# Patient Record
Sex: FEMALE | Race: BLACK OR AFRICAN AMERICAN
[De-identification: names, ages, dates, MRNs, and addresses within clinical notes are randomized per-mention and may not be internally consistent; named-entity substitution may affect disease eponyms.]

---

## 2018-02-24 ENCOUNTER — HOSPITAL ENCOUNTER (EMERGENCY)
Dept: HOSPITAL 17 - NEPD | Age: 23
Discharge: HOME | End: 2018-02-24
Payer: SELF-PAY

## 2018-02-24 VITALS
OXYGEN SATURATION: 99 % | HEART RATE: 98 BPM | TEMPERATURE: 98.4 F | DIASTOLIC BLOOD PRESSURE: 80 MMHG | SYSTOLIC BLOOD PRESSURE: 132 MMHG | RESPIRATION RATE: 14 BRPM

## 2018-02-24 VITALS — WEIGHT: 143.3 LBS | BODY MASS INDEX: 24.46 KG/M2 | HEIGHT: 64 IN

## 2018-02-24 DIAGNOSIS — R11.2: Primary | ICD-10-CM

## 2018-02-24 LAB
ALBUMIN SERPL-MCNC: 4.3 GM/DL (ref 3.4–5)
ALP SERPL-CCNC: 67 U/L (ref 45–117)
ALT SERPL-CCNC: 9 U/L (ref 10–53)
AST SERPL-CCNC: 15 U/L (ref 15–37)
BASOPHILS # BLD AUTO: 0 TH/MM3 (ref 0–0.2)
BASOPHILS NFR BLD: 0.3 % (ref 0–2)
BILIRUB SERPL-MCNC: 1 MG/DL (ref 0.2–1)
BUN SERPL-MCNC: 6 MG/DL (ref 7–18)
CALCIUM SERPL-MCNC: 9.5 MG/DL (ref 8.5–10.1)
CHLORIDE SERPL-SCNC: 105 MEQ/L (ref 98–107)
CREAT SERPL-MCNC: 0.78 MG/DL (ref 0.5–1)
EOSINOPHIL # BLD: 0 TH/MM3 (ref 0–0.4)
EOSINOPHIL NFR BLD: 0.2 % (ref 0–4)
ERYTHROCYTE [DISTWIDTH] IN BLOOD BY AUTOMATED COUNT: 14.4 % (ref 11.6–17.2)
GFR SERPLBLD BASED ON 1.73 SQ M-ARVRAT: 93 ML/MIN (ref 89–?)
GLUCOSE SERPL-MCNC: 87 MG/DL (ref 74–106)
HCO3 BLD-SCNC: 23.6 MEQ/L (ref 21–32)
HCT VFR BLD CALC: 36.3 % (ref 35–46)
HGB BLD-MCNC: 12.5 GM/DL (ref 11.6–15.3)
LYMPHOCYTES # BLD AUTO: 1.4 TH/MM3 (ref 1–4.8)
LYMPHOCYTES NFR BLD AUTO: 20.6 % (ref 9–44)
MCH RBC QN AUTO: 30 PG (ref 27–34)
MCHC RBC AUTO-ENTMCNC: 34.4 % (ref 32–36)
MCV RBC AUTO: 87.2 FL (ref 80–100)
MONOCYTE #: 0.4 TH/MM3 (ref 0–0.9)
MONOCYTES NFR BLD: 5.8 % (ref 0–8)
NEUTROPHILS # BLD AUTO: 5 TH/MM3 (ref 1.8–7.7)
NEUTROPHILS NFR BLD AUTO: 73.1 % (ref 16–70)
PLATELET # BLD: 248 TH/MM3 (ref 150–450)
PMV BLD AUTO: 8.7 FL (ref 7–11)
PROT SERPL-MCNC: 8.8 GM/DL (ref 6.4–8.2)
RBC # BLD AUTO: 4.16 MIL/MM3 (ref 4–5.3)
SODIUM SERPL-SCNC: 135 MEQ/L (ref 136–145)
WBC # BLD AUTO: 6.8 TH/MM3 (ref 4–11)

## 2018-02-24 PROCEDURE — 80053 COMPREHEN METABOLIC PANEL: CPT

## 2018-02-24 PROCEDURE — 85025 COMPLETE CBC W/AUTO DIFF WBC: CPT

## 2018-02-24 PROCEDURE — 99284 EMERGENCY DEPT VISIT MOD MDM: CPT

## 2018-02-24 PROCEDURE — 84702 CHORIONIC GONADOTROPIN TEST: CPT

## 2018-02-24 PROCEDURE — 96360 HYDRATION IV INFUSION INIT: CPT

## 2018-02-24 NOTE — PD
HPI


Chief Complaint:  GI Complaint


Time Seen by Provider:  20:00


Travel History


International Travel<30 days:  No


Contact w/Intl Traveler<30days:  No


Traveled to known affect area:  No





History of Present Illness


HPI


The patient was seen and examined in the presence of the nurse.  This patient 

complains of nausea and vomiting.  Duration is 3 days.  Severity is moderate.  

She is not having constipation or diarrhea or abdominal pain.  No ill contacts.

  She complains of nausea right now.  She denies pregnancy.  No fevers.  No 

alleviating factors.  No exacerbating factors.





PFSH


Past Medical History


Medical History:  Denies Significant Hx


Influenza Vaccination:  No


Pregnant?:  Not Pregnant


LMP:  2 WEEKS AGO


:  0





Past Surgical History


Surgical History:  No Previous Surgery





Social History


Alcohol Use:  No


Tobacco Use:  No


Substance Use:  No





Allergies-Medications


(Allergen,Severity, Reaction):  


Coded Allergies:  


     No Known Allergies (Unverified , 18)


Reported Meds & Prescriptions





Reported Meds & Active Scripts


Active


No Active Prescriptions or Reported Medications    








Review of Systems


General / Constitutional:  No: Fever


Eyes:  No: Visual changes


HENT:  No: Headaches


Cardiovascular:  No: Chest Pain or Discomfort


Respiratory:  No: Shortness of Breath


Gastrointestinal:  Positive: Nausea, Vomiting, No: Abdominal Pain


Genitourinary:  No: Dysuria


Musculoskeletal:  No: Pain


Skin:  No Rash


Neurologic:  No: Weakness


Psychiatric:  No: Depression


Endocrine:  No: Polydipsia


Hematologic/Lymphatic:  No: Easy Bruising





Physical Exam


Narrative


GENERAL: Well-nourished, well-developed patient in no apparent distress.


SKIN: Focused skin assessment reveals no rash and nodules. Skin is Warm and dry.


HEAD: Atraumatic. Normocephalic. 


EYES: Pupils equal and round. No scleral icterus. No injection or drainage. 


ENT: No nasal bleeding or discharge.  Mucous membranes pink and moist.


NECK: Trachea midline. No JVD. 


CARDIOVASCULAR: Regular rate and rhythm.  No murmur appreciated.


RESPIRATORY: No accessory muscle use. Clear to auscultation. Breath sounds 

equal bilaterally. 


GASTROINTESTINAL: Abdomen soft, non-tender, nondistended. Hepatic and splenic 

margins not palpable. 


MUSCULOSKELETAL: No obvious deformities. No clubbing.  No cyanosis.  No edema. 


NEUROLOGICAL: Awake and alert. No obvious cranial nerve deficits.  Motor 

grossly within normal limits. Normal speech.


PSYCHIATRIC: Appropriate mood and affect; insight and judgment normal.





Data


Data


Last Documented VS





Vital Signs








  Date Time  Temp Pulse Resp B/P (MAP) Pulse Ox O2 Delivery O2 Flow Rate FiO2


 


18 18:46 98.4 98 14 132/80 (97) 99 Room Air  








Orders





 Orders


Iv Access Insert/Monitor (18 20:06)


Complete Blood Count With Diff (18 20:06)


Beta Hcg (Quant/Titer) (18 20:06)


Comprehensive Metabolic Panel (18 20:06)


Ondansetron Inj (Zofran Inj) (18 20:15)


Sodium Chlor 0.9% 1000 Ml Inj (Ns 1000 M (18 20:15)





Labs





Laboratory Tests








Test


  18


20:09


 


White Blood Count 6.8 TH/MM3 


 


Red Blood Count 4.16 MIL/MM3 


 


Hemoglobin 12.5 GM/DL 


 


Hematocrit 36.3 % 


 


Mean Corpuscular Volume 87.2 FL 


 


Mean Corpuscular Hemoglobin 30.0 PG 


 


Mean Corpuscular Hemoglobin


Concent 34.4 % 


 


 


Red Cell Distribution Width 14.4 % 


 


Platelet Count 248 TH/MM3 


 


Mean Platelet Volume 8.7 FL 


 


Neutrophils (%) (Auto) 73.1 % 


 


Lymphocytes (%) (Auto) 20.6 % 


 


Monocytes (%) (Auto) 5.8 % 


 


Eosinophils (%) (Auto) 0.2 % 


 


Basophils (%) (Auto) 0.3 % 


 


Neutrophils # (Auto) 5.0 TH/MM3 


 


Lymphocytes # (Auto) 1.4 TH/MM3 


 


Monocytes # (Auto) 0.4 TH/MM3 


 


Eosinophils # (Auto) 0.0 TH/MM3 


 


Basophils # (Auto) 0.0 TH/MM3 


 


CBC Comment DIFF FINAL 


 


Differential Comment  


 


Blood Urea Nitrogen 6 MG/DL 


 


Creatinine 0.78 MG/DL 


 


Random Glucose 87 MG/DL 


 


Total Protein 8.8 GM/DL 


 


Albumin 4.3 GM/DL 


 


Calcium Level 9.5 MG/DL 


 


Alkaline Phosphatase 67 U/L 


 


Aspartate Amino Transf


(AST/SGOT) 15 U/L 


 


 


Alanine Aminotransferase


(ALT/SGPT) 9 U/L 


 


 


Total Bilirubin 1.0 MG/DL 


 


Sodium Level 135 MEQ/L 


 


Potassium Level 4.1 MEQ/L 


 


Chloride Level 105 MEQ/L 


 


Carbon Dioxide Level 23.6 MEQ/L 


 


Anion Gap 6 MEQ/L 


 


Estimat Glomerular Filtration


Rate 93 ML/MIN 


 


 


Human Chorionic Gonadotropin,


Quant LESS THAN 1


MIU/ML











MDM


Medical Decision Making


Medical Screen Exam Complete:  Yes


Emergency Medical Condition:  Yes


Medical Record Reviewed:  Yes


Differential Diagnosis


Gastroenteritis, colitis, hyperemesis gravidarum, dehydration


Narrative Course


I have reviewed the patient's electronic medical record.





I gave her IV Zofran and a liter of normal saline IV bolus


She appears euvolemic now


Beta hCG is negative


CBC and metabolic studies are all normal





Zofran is prescribed


Etiology of her vomiting is unclear





Diagnosis





 Primary Impression:  


 Nausea and vomiting


 Qualified Codes:  R11.2 - Nausea with vomiting, unspecified





***Additional Instructions:  


The patient was advised to follow up with their physician and return if they 

worsen.


I have recommended clear liquids for 24 hours, then gradually advance as 

tolerated.


***Med/Other Pt SpecificInfo:  Prescription(s) given


Scripts


Ondansetron (Zofran) 4 Mg Tab


4 MG PO Q6HR Y for NAUSEA OR VOMITING, #12 TAB 0 Refills


   Prov: Arben Espinal MD         18


Disposition:   DISCHARGE HOME


Condition:  Stable











Arben Espinal MD 2018 20:09

## 2018-03-03 ENCOUNTER — HOSPITAL ENCOUNTER (EMERGENCY)
Dept: HOSPITAL 17 - NEPD | Age: 23
Discharge: HOME | End: 2018-03-03
Payer: SELF-PAY

## 2018-03-03 VITALS
RESPIRATION RATE: 16 BRPM | DIASTOLIC BLOOD PRESSURE: 83 MMHG | HEART RATE: 110 BPM | OXYGEN SATURATION: 100 % | TEMPERATURE: 98.5 F | SYSTOLIC BLOOD PRESSURE: 137 MMHG

## 2018-03-03 VITALS — HEIGHT: 63 IN | BODY MASS INDEX: 24.8 KG/M2 | WEIGHT: 139.99 LBS

## 2018-03-03 DIAGNOSIS — B00.9: Primary | ICD-10-CM

## 2018-03-03 PROCEDURE — 99283 EMERGENCY DEPT VISIT LOW MDM: CPT

## 2018-03-03 NOTE — PD
HPI


Chief Complaint:  Gyn Problem/Complaint


Time Seen by Provider:  21:06


Travel History


International Travel<30 days:  No


Contact w/Intl Traveler<30days:  No


Traveled to known affect area:  No





History of Present Illness


HPI


22-year-old black female presents emergency department with complains of a 

painful rash to her left labia over the last few days.  She states that she is 

not sure if this is a abscess.  She denies any history of STD exposure in the 

past.  Symptoms are moderate.  She does state that the rash is painful.  No 

drainage.





PFSH


Past Medical History


Medical History:  Denies Significant Hx


Diminished Hearing:  No


Immunizations Current:  Yes


Tetanus Vaccination:  < 5 Years


Pregnant?:  Unknown


LMP:  18


:  0





Past Surgical History


Surgical History:  No Previous Surgery





Social History


Alcohol Use:  No


Tobacco Use:  No


Substance Use:  No





Allergies-Medications


(Allergen,Severity, Reaction):  


Coded Allergies:  


     No Known Allergies (Unverified , 18)


Reported Meds & Prescriptions





Reported Meds & Active Scripts


Active


Bactrim DS (Sulfamethoxazole-Trimethoprim) 800-160 Mg Tab 1 Tab PO BID


Acyclovir 400 Mg Tab 400 Mg PO TID 10 Days








Review of Systems


General / Constitutional:  No: Fever


Eyes:  No: Visual changes


HENT:  No: Headaches


Cardiovascular:  No: Chest Pain or Discomfort


Respiratory:  No: Shortness of Breath


Gastrointestinal:  No: Abdominal Pain


Genitourinary:  No: Dysuria


Musculoskeletal:  No: Pain


Skin:  Positive Rash, Positive Lumps


Neurologic:  No: Weakness


Psychiatric:  No: Depression


Endocrine:  No: Polydipsia


Hematologic/Lymphatic:  No: Easy Bruising





Physical Exam


Narrative


GENERAL: This is a well-nourished, well-developed patient, in no apparent 

distress.  Patient is examined in the presence of a female tech


SKIN: Positive vesicular rash along the left labia majora.  No obvious abscess.

, ecchymoses or lesions. Warm and dry.


HEAD: Atraumatic. Normocephalic.


EYES: PERRL, EOMI, no discharge or injection. No scleral icterus.


EARS: Clear


NOSE: Nasal turbinates appear normal.


THROAT: Mucosa pink and moist.  Airway patent.


NECK: Trachea midline. supple, moves head freely.


LUNGS: Clear to auscultation.


CV: Regular in rhythm.


ABDOMEN: Soft nontender.


EXT: No clubbing cyanosis or edema.





Data


Data


Last Documented VS





Vital Signs








  Date Time  Temp Pulse Resp B/P (MAP) Pulse Ox O2 Delivery O2 Flow Rate FiO2


 


3/3/18 19:55 98.5 110 16 137/83 (101) 100 Room Air  











MDM


Medical Decision Making


Medical Screen Exam Complete:  Yes


Emergency Medical Condition:  Yes


Medical Record Reviewed:  Yes


Differential Diagnosis


MDM: High


Differential diagnoses: Abscess, folliculitis, cellulitis, lymphangitis, 

abrasion, contact dermatitis, genital herpes


Narrative Course


Patient's exam is more consistent with genital herpes.  I expressed this to the 

patient.  She will be given acyclovir.  Patient is also aware that if she 

starts developing any signs of any secondary bacterial infection she can take 

Bactrim which she is given as well.  She is to follow-up with the clinic in 

school.





Diagnosis





 Primary Impression:  


 General herpes


Patient Instructions:  General Instructions





***Additional Instructions:  


Rest.


Keep area clean and dry.


Acyclovir.


Follow-up with the clinic at school in the next 2-3 days.


Return to the ER for any problems.


***Med/Other Pt SpecificInfo:  Prescription(s) given


Scripts


Sulfamethoxazole-Trimethoprim (Bactrim DS) 800-160 Mg Tab


1 TAB PO BID for Infection, #14 TAB 0 Refills


   Prov: Esther Almendarez MD         3/3/18 


Acyclovir (Acyclovir) 400 Mg Tab


400 MG PO TID for Mgmt Viral Infection for 10 Days, TAB 0 Refills


   Prov: Esther Almendarez MD         3/3/18


Disposition:  01 DISCHARGE HOME


Condition:  Stable











Raudel Marcum Mar 3, 2018 21:24